# Patient Record
Sex: MALE | Race: WHITE | NOT HISPANIC OR LATINO | Employment: FULL TIME | RURAL
[De-identification: names, ages, dates, MRNs, and addresses within clinical notes are randomized per-mention and may not be internally consistent; named-entity substitution may affect disease eponyms.]

---

## 2022-03-25 ENCOUNTER — OFFICE VISIT (OUTPATIENT)
Dept: OTOLARYNGOLOGY | Facility: CLINIC | Age: 57
End: 2022-03-25

## 2022-03-25 VITALS — HEIGHT: 71 IN | WEIGHT: 228 LBS | OXYGEN SATURATION: 99 % | BODY MASS INDEX: 31.92 KG/M2

## 2022-03-25 DIAGNOSIS — H93.8X2 MASS OF LEFT EAR CANAL: Primary | ICD-10-CM

## 2022-03-25 DIAGNOSIS — H61.23 BILATERAL IMPACTED CERUMEN: ICD-10-CM

## 2022-03-25 PROCEDURE — 69210 REMOVE IMPACTED EAR WAX UNI: CPT | Performed by: OTOLARYNGOLOGY

## 2022-03-25 PROCEDURE — 99203 OFFICE O/P NEW LOW 30 MIN: CPT | Performed by: OTOLARYNGOLOGY

## 2022-03-25 RX ORDER — LISINOPRIL 20 MG/1
TABLET ORAL
COMMUNITY
Start: 2022-01-12

## 2022-03-25 NOTE — PROGRESS NOTES
Lily Fontaine is a 56 y.o. male.       History of Present Illness   Patient reports he began having some discharge from his ear recently.  Was seen elsewhere and given oral antibiotics.  Uses earplugs daily at work.  Was apparently noted to have a mass in his left ear canal.  Did not have a lot of problems with his ears when he was younger.      The following portions of the patient's history were reviewed and updated as appropriate: allergies, current medications, past family history, past medical history, past social history, past surgical history and problem list.     reports that he has never smoked. He uses smokeless tobacco.   Patient is not a tobacco user and has not been counseled for use of tobacco products      Review of Systems        Objective   Physical Exam  Ears: External ears no deformity.  Right ear canal shows a cerumen impaction.  Left ear canal shows a bony mass in the posterior lateral ear canal consistent with a likely osteoma and then medial to that there is also a cerumen impaction  Using the binocular microscope for visualization, cerumen impaction was removed from bilateral ear canal(s) using instrumentation. This was personally performed by Kevin Johns MD  Following cerumen removal both tympanic membranes appear to be intact and clear  Nares: No discharge or purulence  Oral cavity: No masses or lesions  Pharynx: No erythema or exudate  Neck: No adenopathy or mass    Assessment/Plan   Diagnoses and all orders for this visit:    1. Mass of left ear canal (Primary)  -     Cancel: MRI Internal Auditory Canal With Wo; Future  -     CT temporal bones wo contrast; Future    2. Bilateral impacted cerumen      Plan: Cerumen removed as described above.  Will obtain CT scan of the temporal bones to determine the extent of the lesion and then discussed treatment options after that is obtained.

## 2022-04-11 ENCOUNTER — HOSPITAL ENCOUNTER (OUTPATIENT)
Dept: CT IMAGING | Facility: HOSPITAL | Age: 57
Discharge: HOME | End: 2022-04-11
Admitting: OTOLARYNGOLOGY

## 2022-04-11 ENCOUNTER — OFFICE VISIT (OUTPATIENT)
Dept: OTOLARYNGOLOGY | Facility: CLINIC | Age: 57
End: 2022-04-11

## 2022-04-11 VITALS — HEART RATE: 86 BPM | HEIGHT: 71 IN | BODY MASS INDEX: 32.17 KG/M2 | WEIGHT: 229.8 LBS | OXYGEN SATURATION: 98 %

## 2022-04-11 DIAGNOSIS — D16.4 OSTEOMA OF EAR CANAL: Primary | ICD-10-CM

## 2022-04-11 DIAGNOSIS — H93.8X2 MASS OF LEFT EAR CANAL: ICD-10-CM

## 2022-04-11 PROCEDURE — 70480 CT ORBIT/EAR/FOSSA W/O DYE: CPT

## 2022-04-11 PROCEDURE — 99214 OFFICE O/P EST MOD 30 MIN: CPT | Performed by: OTOLARYNGOLOGY

## 2022-04-12 PROBLEM — D16.4 OSTEOMA OF EAR CANAL: Status: ACTIVE | Noted: 2022-04-12

## 2022-04-12 NOTE — PROGRESS NOTES
Subjective   James Fontaine is a 56 y.o. male.     History of Present Illness   Patient was seen previously with a mass in the left ear canal.  Returns today having undergone a CT scan of the temporal bones.  This is personally reviewed and shows the mass to be calcified/bony consistency consistent with an osteoma.  Patient reports not having any otorrhea today.      The following portions of the patient's history were reviewed and updated as appropriate: allergies, current medications, past family history, past medical history, past social history, past surgical history and problem list.      James Fontaine reports that he has never smoked. He uses smokeless tobacco.  Patient is a tobacco user and has been counseled for use of tobacco products    No family history on file.   Negative for bleeding disorder    No Known Allergies      Current Outpatient Medications:   •  lisinopril (PRINIVIL,ZESTRIL) 20 MG tablet, , Disp: , Rfl:     Past Medical History:   Diagnosis Date   • High blood pressure        Past Surgical History:   Procedure Laterality Date   • LEG SURGERY Right     roro and screws         Review of Systems   Constitutional: Negative for fever.   Hematological: Does not bruise/bleed easily.           Objective   Physical Exam  General: Male no acute distress.  Alert and oriented.  Head normocephalic.  Right external ear shows no deformity.  Canals no discharge.  Tympanic membrane intact and clear.  Left external ear shows no deformity.  In the ear canal laterally is a bony mass consistent with an osteoma that appears to be primarily posteriorly based.  No purulence  Nares: No discharge or purulence  Oral cavity: No masses or lesions  Neck: No adenopathy or mass        Assessment/Plan   Diagnoses and all orders for this visit:    1. Osteoma of ear canal (Primary)  -     Case Request; Standing  -     COVID PRE-OP / PRE-PROCEDURE SCREENING ORDER (NO ISOLATION) - Swab, Nasopharynx; Future  -     Case Request    Other  orders  -     Follow Anesthesia Guidelines / Standing Orders; Future  -     Obtain Informed Consent; Future      Plan: Offered excision of the osteoma under general anesthesia.  I explained the nature of the procedure to the patient in layman's terms including need for general anesthetic and risks including bleeding, recurrence, skin scarring and narrowing of the eardrum, as well as possible need for further treatment depending on pathology.  Proposed benefit would be definitive diagnosis and hopefully definitive treatment.  The alternative would be observation.  Patient voices understanding and wishes to proceed.  This is scheduled.

## 2022-07-08 ENCOUNTER — APPOINTMENT (OUTPATIENT)
Dept: PREADMISSION TESTING | Facility: HOSPITAL | Age: 57
End: 2022-07-08